# Patient Record
Sex: MALE | Race: ASIAN | NOT HISPANIC OR LATINO | URBAN - METROPOLITAN AREA
[De-identification: names, ages, dates, MRNs, and addresses within clinical notes are randomized per-mention and may not be internally consistent; named-entity substitution may affect disease eponyms.]

---

## 2023-06-14 ENCOUNTER — EMERGENCY (EMERGENCY)
Facility: HOSPITAL | Age: 43
LOS: 1 days | Discharge: HOME | End: 2023-06-14
Attending: STUDENT IN AN ORGANIZED HEALTH CARE EDUCATION/TRAINING PROGRAM
Payer: COMMERCIAL

## 2023-06-14 VITALS
RESPIRATION RATE: 18 BRPM | HEART RATE: 84 BPM | DIASTOLIC BLOOD PRESSURE: 87 MMHG | WEIGHT: 149.91 LBS | HEIGHT: 65 IN | TEMPERATURE: 98 F | SYSTOLIC BLOOD PRESSURE: 130 MMHG | OXYGEN SATURATION: 100 %

## 2023-06-14 VITALS
DIASTOLIC BLOOD PRESSURE: 79 MMHG | HEART RATE: 76 BPM | RESPIRATION RATE: 17 BRPM | OXYGEN SATURATION: 99 % | TEMPERATURE: 98 F | SYSTOLIC BLOOD PRESSURE: 145 MMHG

## 2023-06-14 PROCEDURE — 73630 X-RAY EXAM OF FOOT: CPT | Mod: 26,LT

## 2023-06-14 PROCEDURE — 73630 X-RAY EXAM OF FOOT: CPT

## 2023-06-14 PROCEDURE — 99285 EMERGENCY DEPT VISIT HI MDM: CPT

## 2023-06-14 PROCEDURE — 99284 EMERGENCY DEPT VISIT MOD MDM: CPT | Mod: 25

## 2023-06-14 PROCEDURE — 72125 CT NECK SPINE W/O DYE: CPT | Mod: MA

## 2023-06-14 PROCEDURE — 70450 CT HEAD/BRAIN W/O DYE: CPT | Mod: MA

## 2023-06-14 PROCEDURE — 71250 CT THORAX DX C-: CPT | Mod: MA

## 2023-06-14 PROCEDURE — 71250 CT THORAX DX C-: CPT | Mod: 26,MA

## 2023-06-14 PROCEDURE — 72125 CT NECK SPINE W/O DYE: CPT | Mod: 26,MA

## 2023-06-14 PROCEDURE — 70450 CT HEAD/BRAIN W/O DYE: CPT | Mod: 26,MA

## 2023-06-14 RX ORDER — LIDOCAINE HCL 20 MG/ML
10 VIAL (ML) INJECTION ONCE
Refills: 0 | Status: COMPLETED | OUTPATIENT
Start: 2023-06-14 | End: 2023-06-14

## 2023-06-14 RX ORDER — IBUPROFEN 200 MG
1 TABLET ORAL
Qty: 20 | Refills: 0
Start: 2023-06-14

## 2023-06-14 RX ADMIN — Medication 10 MILLILITER(S): at 14:33
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
## 2023-06-14 NOTE — ED PROVIDER NOTE - NSFOLLOWUPINSTRUCTIONS_ED_ALL_ED_FT
Rib Fracture(s)    A rib fracture is a break or crack in one of the bones of the ribs. The ribs are a group of long, curved bones that wrap around your chest and attach to your spine. A broken or cracked rib is often painful, but most do not cause other problems and heal on their own over time. Symptoms include pain when you breath or cough or pain when pressing over the area. Breathing exercises will help keep your lungs inflated and prevent lung collapse or infection.    Take over the counter acetaminophen (Tylenol) 650-1000 mg every 4-6 hours as needed for pain. Do not take more than 3000 mg in a 24 hour period. Be aware many over the counter and prescription medications also contain acetaminophen (Tylenol).     Take prescription ibuprofen every 6 hours with food as needed for pain.     Apply over the counter lidocaine patch as needed for pain; leave on for up to 12 hours, leave off for 12 hours.     Follow up with your primary care physician in 1-3 days.     Use incentive spirometer as discussed.     SEEK IMMEDIATE MEDICAL CARE IF YOU HAVE ANY OF THE FOLLOWING SYMPTOMS: fever, shortness of breath, coughing up blood, abdominal pain, nausea or vomiting, pain not controlled with medications.
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
## 2023-06-14 NOTE — ED PROVIDER NOTE - PATIENT PORTAL LINK FT
You can access the FollowMyHealth Patient Portal offered by [facility] by registering at the following website: [email or web address]. By joining [facility]’s FollowMyHealth portal, you will also be able to view your health information using other applications (apps) compatible with our system.

## 2023-06-14 NOTE — ED ADULT NURSE NOTE - NSFALLUNIVINTERV_ED_ALL_ED
Bed/Stretcher in lowest position, wheels locked, appropriate side rails in place/Call bell, personal items and telephone in reach/Instruct patient to call for assistance before getting out of bed/chair/stretcher/Non-slip footwear applied when patient is off stretcher/[place] to call system/Physically safe environment - no spills, clutter or unnecessary equipment/Purposeful proactive rounding/Room/bathroom lighting operational, light cord in reach

## 2023-06-14 NOTE — ED PROVIDER NOTE - PROGRESS NOTE DETAILS
Lucks-DO: rib fx noted on CT. 1st digit toe proximal phalanx fx noted, podiatry consulted with splint placed/crutches provided. Will DC with PMD follow up/return precautions.

## 2023-06-15 ENCOUNTER — APPOINTMENT (OUTPATIENT)
Dept: PODIATRY | Facility: CLINIC | Age: 43
End: 2023-06-15

## 2023-06-15 ENCOUNTER — OUTPATIENT (OUTPATIENT)
Dept: OUTPATIENT SERVICES | Facility: HOSPITAL | Age: 43
LOS: 1 days | End: 2023-06-15
Payer: COMMERCIAL

## 2023-06-15 VITALS
HEIGHT: 65 IN | WEIGHT: 145 LBS | TEMPERATURE: 98.1 F | SYSTOLIC BLOOD PRESSURE: 120 MMHG | OXYGEN SATURATION: 99 % | RESPIRATION RATE: 18 BRPM | DIASTOLIC BLOOD PRESSURE: 83 MMHG | HEART RATE: 70 BPM | BODY MASS INDEX: 24.16 KG/M2

## 2023-06-15 DIAGNOSIS — Z87.891 PERSONAL HISTORY OF NICOTINE DEPENDENCE: ICD-10-CM

## 2023-06-15 DIAGNOSIS — S92.912A UNSPECIFIED FRACTURE OF LEFT TOE(S), INITIAL ENCOUNTER FOR CLOSED FRACTURE: ICD-10-CM

## 2023-06-15 DIAGNOSIS — S62.102A FRACTURE OF UNSPECIFIED CARPAL BONE, LEFT WRIST, INITIAL ENCOUNTER FOR CLOSED FRACTURE: ICD-10-CM

## 2023-06-15 DIAGNOSIS — S92.412A DISPLACED FRACTURE OF PROXIMAL PHALANX OF LEFT GREAT TOE, INITIAL ENCOUNTER FOR CLOSED FRACTURE: ICD-10-CM

## 2023-06-15 DIAGNOSIS — Z78.9 OTHER SPECIFIED HEALTH STATUS: ICD-10-CM

## 2023-06-15 DIAGNOSIS — Z87.81 PERSONAL HISTORY OF (HEALED) TRAUMATIC FRACTURE: ICD-10-CM
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
## 2023-06-15 DIAGNOSIS — Z00.00 ENCOUNTER FOR GENERAL ADULT MEDICAL EXAMINATION WITHOUT ABNORMAL FINDINGS: ICD-10-CM

## 2023-06-15 PROCEDURE — G0463: CPT

## 2023-06-15 RX ORDER — IBUPROFEN 800 MG/1
TABLET, FILM COATED ORAL
Refills: 0 | Status: ACTIVE | COMMUNITY

## 2023-06-21 NOTE — ASSESSMENT
[FreeTextEntry1] : ROS: unremararkable outside HPI\par \par Physical\par Vasc:  dp and pt palpable 2/4 with pedal hair noted with moderate edema formation to L 1st hallux extending proximal to 1st mpj\par Derm: no open wounds, no skin tenting, skin wrinkle noted to L 1st MPJ, ecchymosis noted to medial hallux L foot\par Neuro: protective sensation intact\par MSK: TTP to Left hallux, denies calf pain, no pain to RLE\par \par A:\par Left hallux communited proximal phalanx fx \par \par Plan: \par Patient evaluated and Chart reviewed \par Discussed diagnosis and treatment with patient\par X-rays evaluated, shows comminuted fx of L proximal phalanx\par Re-Applied toe splint, khan compression with surgical shoe.\par Pt able to shower after removing toe splint. To reapply toe splint and ace compression after\par Pt to remain WB to heel LLE\par advised pt to f/u RICE protocol \par Advised pt Take otc pain medication prn\par pt to f/u at Dr. Winchester private office at [facility] in 3 weeks.\par Discussed with Attending Dr. Winchester \par \par \par
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
## 2024-05-29 NOTE — ED PROVIDER NOTE - NSDCPRINTRESULTS_ED_ALL_ED
Patient requests all Lab, Cardiology, and Radiology Results on their Discharge Instructions bleeding